# Patient Record
(demographics unavailable — no encounter records)

---

## 2024-10-15 NOTE — REVIEW OF SYSTEMS
[Abdominal Pain] : no abdominal pain [Nausea] : no nausea [Constipation] : no constipation [Diarrhea] : diarrhea [Vomiting] : no vomiting [Heartburn] : no heartburn [Melena] : no melena [Dysuria] : no dysuria [Hematuria] : no hematuria [Joint Pain] : no joint pain [Muscle Pain] : no muscle pain [Skin Rash] : no skin rash [Headache] : no headache [Suicidal] : not suicidal [Insomnia] : no insomnia [Anxiety] : anxiety [Depression] : no depression [Swollen Glands] : no swollen glands [Negative] : Respiratory [FreeTextEntry7] : abdominal discomfort

## 2024-10-15 NOTE — HISTORY OF PRESENT ILLNESS
[FreeTextEntry8] : Ms. BRITTNEY FERNANDEZ is a 23 year old female who presents to the office to establish care.  Medical hx reviewed. Last seen by PCP 3 years ago.  Follows GI - Dr. Ordoñez ([place]), started iron supplementation. ENDO/COLO in the past. notable for gastritis Pending CT A/P w/contrast this Thursday due to abdominal discomfort. Needs Dr. Mendez (Psychiatrist)- needs clearance prior to starting anxiety medication.  HCM:  LMP: 10/14/24- irregular.  PAP: none prior.

## 2024-10-15 NOTE — HEALTH RISK ASSESSMENT
[Yes] : Yes [Monthly or less (1 pt)] : Monthly or less (1 point) [1 or 2 (0 pts)] : 1 or 2 (0 points) [Never (0 pts)] : Never (0 points) [No] : In the past 12 months have you used drugs other than those required for medical reasons? No [1] : 2) Feeling down, depressed, or hopeless for several days (1) [PHQ-2 Negative - No further assessment needed] : PHQ-2 Negative - No further assessment needed [Never] : Never [de-identified] : n/a [de-identified] : GI,  [[id number]] : 2

## 2024-10-15 NOTE — SIGNATURES
[TextEntry] : Bryanna Dooley D.O.  Family Medicine [place] [address] [phone]

## 2024-10-24 NOTE — ASSESSMENT
[FreeTextEntry1] : Annual physical/wellness visit performed today Routine measurements including height, weight, BMI, and blood pressure performed. Medications reviewed and reconciled.  Tobacco, alcohol, and drug screening performed.  Annual depression screening performed.  Diet and exercise discussed.   Preventative Services checklist reviewed including:  Annual Flu Vaccine- due  Pap Smear- DUE, pt given referral   In office EKG performed: NSR at 80bpm, NAD, no acute ischemia, no prior EKG available. In office hearing test performed: failed Right hearing, -referral to ENT provided.

## 2024-10-24 NOTE — PHYSICAL EXAM
[Supple] : supple [Pedal Pulses Present] : the pedal pulses are present [No Edema] : there was no peripheral edema [Soft] : abdomen soft [Non Tender] : non-tender [Non-distended] : non-distended [Normal Bowel Sounds] : normal bowel sounds [No Spinal Tenderness] : no spinal tenderness [Grossly Normal Strength/Tone] : grossly normal strength/tone [Normal] : affect was normal and insight and judgment were intact [Normal Oropharynx] : the oropharynx was normal [de-identified] : Right ear impacted.

## 2024-10-24 NOTE — SIGNATURES
[TextEntry] : Bryanna Dooley D.O.  Family Medicine [place] [address] [phone]

## 2024-10-24 NOTE — HEALTH RISK ASSESSMENT
[Very Good] : ~his/her~  mood as very good [Yes] : Yes [Monthly or less (1 pt)] : Monthly or less (1 point) [1 or 2 (0 pts)] : 1 or 2 (0 points) [Never (0 pts)] : Never (0 points) [No] : In the past 12 months have you used drugs other than those required for medical reasons? No [No falls in past year] : Patient reported no falls in the past year [Never] : Never [None] : None [With Family] : lives with family [Employed] : employed [Student] : student [Significant Other] : lives with significant other [Feels Safe at Home] : Feels safe at home [Fully functional (bathing, dressing, toileting, transferring, walking, feeding)] : Fully functional (bathing, dressing, toileting, transferring, walking, feeding) [Fully functional (using the telephone, shopping, preparing meals, housekeeping, doing laundry, using] : Fully functional and needs no help or supervision to perform IADLs (using the telephone, shopping, preparing meals, housekeeping, doing laundry, using transportation, managing medications and managing finances) [Reports normal functional visual acuity (ie: able to read med bottle)] : Reports normal functional visual acuity [Smoke Detector] : smoke detector [Carbon Monoxide Detector] : carbon monoxide detector [de-identified] : n/a [de-identified] : GI, PSYCHIATRIST [de-identified] : gym 6x/day- cardio/weights [de-identified] : limited due to GI issues, granola, fruit, dinner [EyeExamDate] : 10/2024 [Change in mental status noted] : No change in mental status noted [Reports changes in hearing] : Reports no changes in hearing [Reports changes in vision] : Reports no changes in vision [Reports changes in dental health] : Reports no changes in dental health [PapSmearDate] : due [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

## 2024-10-24 NOTE — PHYSICAL EXAM
[Supple] : supple [Pedal Pulses Present] : the pedal pulses are present [No Edema] : there was no peripheral edema [Soft] : abdomen soft [Non Tender] : non-tender [Non-distended] : non-distended [Normal Bowel Sounds] : normal bowel sounds [No Spinal Tenderness] : no spinal tenderness [Grossly Normal Strength/Tone] : grossly normal strength/tone [Normal] : affect was normal and insight and judgment were intact [Normal Oropharynx] : the oropharynx was normal [de-identified] : Right ear impacted.

## 2024-10-24 NOTE — REVIEW OF SYSTEMS
[Anxiety] : anxiety [Negative] : Respiratory [Abdominal Pain] : no abdominal pain [Nausea] : no nausea [Constipation] : no constipation [Diarrhea] : diarrhea [Vomiting] : no vomiting [Heartburn] : no heartburn [Melena] : no melena [Dysuria] : no dysuria [Hematuria] : no hematuria [Joint Pain] : no joint pain [Muscle Pain] : no muscle pain [Skin Rash] : no skin rash [Headache] : no headache [Suicidal] : not suicidal [Insomnia] : no insomnia [Depression] : no depression [Swollen Glands] : no swollen glands

## 2024-10-24 NOTE — HISTORY OF PRESENT ILLNESS
[FreeTextEntry1] : CPE, fatigue [de-identified] : Ms. BRITTNEY FERNANDEZ is a 23 year old female who presents to the office for CPE  Reviewed and discussed lab results.  C/o fatigue, chronic, worsens by afternoon.  C/o frontal HA chronic since age 2, was seen in the past by specialist, states it was unremarkable. Continues to have migraines every other week intermittent, lasts usually for 2-3 days, usually takes acetaminophen with relief. Does usually find herself resting for relief as well.

## 2024-10-24 NOTE — HEALTH RISK ASSESSMENT
[Very Good] : ~his/her~  mood as very good [Yes] : Yes [Monthly or less (1 pt)] : Monthly or less (1 point) [1 or 2 (0 pts)] : 1 or 2 (0 points) [Never (0 pts)] : Never (0 points) [No] : In the past 12 months have you used drugs other than those required for medical reasons? No [No falls in past year] : Patient reported no falls in the past year [Never] : Never [None] : None [With Family] : lives with family [Employed] : employed [Student] : student [Significant Other] : lives with significant other [Feels Safe at Home] : Feels safe at home [Fully functional (bathing, dressing, toileting, transferring, walking, feeding)] : Fully functional (bathing, dressing, toileting, transferring, walking, feeding) [Fully functional (using the telephone, shopping, preparing meals, housekeeping, doing laundry, using] : Fully functional and needs no help or supervision to perform IADLs (using the telephone, shopping, preparing meals, housekeeping, doing laundry, using transportation, managing medications and managing finances) [Reports normal functional visual acuity (ie: able to read med bottle)] : Reports normal functional visual acuity [Smoke Detector] : smoke detector [Carbon Monoxide Detector] : carbon monoxide detector [de-identified] : n/a [de-identified] : GI, PSYCHIATRIST [de-identified] : gym 6x/day- cardio/weights [de-identified] : limited due to GI issues, granola, fruit, dinner [EyeExamDate] : 10/2024 [Change in mental status noted] : No change in mental status noted [Reports changes in hearing] : Reports no changes in hearing [Reports changes in vision] : Reports no changes in vision [Reports changes in dental health] : Reports no changes in dental health [PapSmearDate] : due [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

## 2024-10-24 NOTE — HISTORY OF PRESENT ILLNESS
[FreeTextEntry1] : CPE, fatigue [de-identified] : Ms. BRITTNEY FERNANDEZ is a 23 year old female who presents to the office for CPE  Reviewed and discussed lab results.  C/o fatigue, chronic, worsens by afternoon.  C/o frontal HA chronic since age 2, was seen in the past by specialist, states it was unremarkable. Continues to have migraines every other week intermittent, lasts usually for 2-3 days, usually takes acetaminophen with relief. Does usually find herself resting for relief as well.

## 2024-10-24 NOTE — COUNSELING
[Behavioral health counseling provided] : Behavioral health counseling provided [Sleep ___ hours/day] : Sleep [unfilled] hours/day [Engage in a relaxing activity] : Engage in a relaxing activity [Plan in advance] : Plan in advance [Encouraged to maintain food diary] : Encouraged to maintain food diary [Encouraged to increase physical activity] : Encouraged to increase physical activity [____ min/wk Activity] : [unfilled] min/wk activity

## 2024-10-24 NOTE — SIGNATURES
[TextEntry] : Bryanna Dooley D.O.  Family Medicine [place] [address] [phone]